# Patient Record
(demographics unavailable — no encounter records)

---

## 2024-11-27 NOTE — HISTORY OF PRESENT ILLNESS
[Condoms] : uses condoms [Y] : Patient is sexually active [Monogamous (Male Partner)] : is monogamous with a male partner [FreeTextEntry1] : Postcoital bleeding X 2  Last pap 6 months ago [LMPDate] : 11/6/24 [MensesFreq] : 29  [MensesLength] : 6 [PGHxTotal] : 0

## 2024-11-27 NOTE — PHYSICAL EXAM
[Chaperone Present] : A chaperone was present in the examining room during all aspects of the physical examination [Appropriately responsive] : appropriately responsive [Alert] : alert [No Acute Distress] : no acute distress [Soft] : soft [Non-tender] : non-tender [Non-distended] : non-distended [No HSM] : No HSM [No Lesions] : no lesions [No Mass] : no mass [Oriented x3] : oriented x3 [Labia Majora] : normal [Labia Minora] : normal [No Bleeding] : There was no active vaginal bleeding [Discharge] : discharge [Moderate] : moderate [White] : white [Thin] : thin [Normal] : normal [Normal Position] : in a normal position [Tenderness] : nontender [Enlarged ___ wks] : not enlarged [Mass ___ cm] : no uterine mass was palpated [Uterine Adnexae] : normal [FreeTextEntry5] : + bleeding with the pap

## 2024-12-17 NOTE — HISTORY OF PRESENT ILLNESS
[FreeTextEntry1] : 29 year old female presents to establish care for post coital bleeding. LMP 12/05/24 C/o several episodes of post coital bleeding after wiping. Did notice a small cut at introitus. Using condoms for contraception. Periods are every 29d and lasting 6d.   GYNHx: h/o Gardasil vaccine (2022) Allergies: Nitrofurantoin - nausea/vomiting  FamHx: HTN - father / HLD - father  [PapSmeardate] : 11/24

## 2024-12-17 NOTE — PLAN
[FreeTextEntry1] : 29 year old female presents for post coital bleeding  -Discussed lubrications -Recommended OTC hydrocortisone -Pt to monitor sxs

## 2024-12-17 NOTE — PHYSICAL EXAM
[Chaperone Present] : A chaperone was present in the examining room during all aspects of the physical examination [[id number]] : A chaperone was present during the pelvic exam. [Appropriately responsive] : appropriately responsive [Alert] : alert [No Acute Distress] : no acute distress [Soft] : soft [Non-tender] : non-tender [Non-distended] : non-distended [No HSM] : No HSM [No Lesions] : no lesions [No Mass] : no mass [Oriented x3] : oriented x3 [Labia Majora] : normal [Labia Minora] : normal [Normal] : normal [Uterine Adnexae] : normal [FreeTextEntry2] : Ana Laura Chin

## 2024-12-17 NOTE — END OF VISIT
[FreeTextEntry3] : I, Ana Laura Kris, acted as a scribe on behalf of Dr. Amanda Rico D.O. on 12/17/2024.  All medical entries made by the scribe were at my, Dr. Amanda Rcio D.O, direction and personally dictated by me on 12/17/2024. I have reviewed the chart and agree that the record accurately reflects my personal performance of the history, physical exam, assessment and plan. I have also personally directed, reviewed, and agreed with the chart.

## 2025-01-08 NOTE — DISCUSSION/SUMMARY
[FreeTextEntry1] : Postcoital-spotting- no evidence of labial/vaginal /or cx lesion.  Based upon pictures showing small abrasions.  Advised to increase lubrication/ change positions to increase opening at introitus. I spent the time noted on the day of this patient encounter preparing for, providing and documenting the above service. I have counseled and educated the patient on the differential, workup, disease course, and treatment/management plan. Education was provided to the patient during this encounter. All questions and concerns were answered and addressed in detail.

## 2025-01-08 NOTE — PHYSICAL EXAM
[Chaperone Present] : A chaperone was present in the examining room during all aspects of the physical examination [Labia Majora] : normal [Labia Minora] : normal [No Bleeding] : There was no active vaginal bleeding [Normal] : normal [Uterine Adnexae] : normal [Soft] : soft [Non-tender] : non-tender [Non-distended] : non-distended [Oriented x3] : oriented x3

## 2025-01-08 NOTE — HISTORY OF PRESENT ILLNESS
[FreeTextEntry1] : 28 yo presents for evaluation of few episodes of postcoital bleeding.  Patient was evaluated for same sxs showing negative cultures, pap and exam.  Patient's  showed 2 pics showing a small laceration/abrasion at introitus and right labia majora.  Patient denies any bleeding today.

## 2025-05-01 NOTE — REVIEW OF SYSTEMS
[Vertigo] : vertigo [Negative] : Eyes [Ear Pain] : no ear pain [Dizziness] : no dizziness [Recurrent Ear Infections] : no recurrent ear infections [Ear Drainage] : no ear drainage [Ear Noises] : no ear noises

## 2025-05-01 NOTE — REASON FOR VISIT
[Initial Evaluation] : an initial evaluation for [Hearing Loss] : hearing loss [Vertigo] : vertigo [FreeTextEntry2] : vertigo

## 2025-05-01 NOTE — HISTORY OF PRESENT ILLNESS
[de-identified] : Sierra Paul is a 31 yo female with hx BPPV who presents for evaluation of vertigo. She had an episode of vertigo about six days ago. She woke up in the morning and turned her head to the right, and experienced an episode of vertigo lasting for seconds. She had residual dysequilibrium for hte remainder of the day. She went to ED and she was given zofran and meclizine. She recovered from vertigo. She denies hearing loss, tinnitus, otalgia, otorrhea. She denies facial weakness or numbness. She denies weakness or numbness of extremities. She has influenza two weeks ago. She denies head trauma. She notes previous workup for vertigo wtih normal hearing at that time.

## 2025-05-01 NOTE — PHYSICAL EXAM
[de-identified] : Left cerumen impaction. Right EAC wnl. [Midline] : trachea located in midline position [Normal] : no rashes

## 2025-05-01 NOTE — ASSESSMENT
[FreeTextEntry1] : Sierra Paul presents for evaluation of vertigo. She has history of BPPV and notes previous normal workup with audiogram. ED notes from last week reviewed. History is consistent with benign paroxysmal positional vertigo. Left cerumen was removed. Will refer for [facility] vestibular rehab.  - f/u prn

## 2025-05-12 NOTE — PHYSICAL EXAM
[No Acute Distress] : no acute distress [Well-Appearing] : well-appearing [Normal Outer Ear/Nose] : the outer ears and nose were normal in appearance [Normal Oropharynx] : the oropharynx was normal [Normal TMs] : both tympanic membranes were normal [No Respiratory Distress] : no respiratory distress  [No Accessory Muscle Use] : no accessory muscle use [Clear to Auscultation] : lungs were clear to auscultation bilaterally [Normal Rate] : normal rate  [Regular Rhythm] : with a regular rhythm [Normal S1, S2] : normal S1 and S2 [No Murmur] : no murmur heard [Soft] : abdomen soft [Non Tender] : non-tender [Non-distended] : non-distended [No Focal Deficits] : no focal deficits [Normal Gait] : normal gait [Normal Affect] : the affect was normal [Alert and Oriented x3] : oriented to person, place, and time [de-identified] : L ear white cystic lesion on left side of external canal

## 2025-05-12 NOTE — ASSESSMENT
[FreeTextEntry1] : Case discussed with Dr. Reyes  RTC for CPE f/u with ENT for pearly lesion on L ear on canal

## 2025-05-12 NOTE — PHYSICAL EXAM
[No Acute Distress] : no acute distress [Well-Appearing] : well-appearing [Normal Outer Ear/Nose] : the outer ears and nose were normal in appearance [Normal Oropharynx] : the oropharynx was normal [Normal TMs] : both tympanic membranes were normal [No Respiratory Distress] : no respiratory distress  [No Accessory Muscle Use] : no accessory muscle use [Clear to Auscultation] : lungs were clear to auscultation bilaterally [Normal Rate] : normal rate  [Regular Rhythm] : with a regular rhythm [Normal S1, S2] : normal S1 and S2 [No Murmur] : no murmur heard [Soft] : abdomen soft [Non Tender] : non-tender [Non-distended] : non-distended [No Focal Deficits] : no focal deficits [Normal Gait] : normal gait [Normal Affect] : the affect was normal [Alert and Oriented x3] : oriented to person, place, and time [de-identified] : L ear white cystic lesion on left side of external canal

## 2025-05-12 NOTE — HISTORY OF PRESENT ILLNESS
[Post-hospitalization from ___ Hospital] : Post-hospitalization from [unfilled] Hospital [Discharge Summary] : discharge summary [Pertinent Labs] : pertinent labs [FreeTextEntry2] : POST ED VISIT: 31 y/o F with PMHx of vertigo presents with dizziness x 2 days. Admits dx with flu 2 weeks ago and has lingering dry cough and BL ear fullness. Denies weakness, cp, sob, difficulty breathing. Went to urgent care and prescribed meclizine but vomited nbnb x 4 since urgent care. Denies abd pain, cp, sob, difficulty breathing  Today, pt denies any vertigo. Saw ENT this week and had cerumen disimpaction of Left ear. She is worried with her glucose as her ED visit labs showed gluc 116. Denies fam hx of diabetes. Has not needed meclizine since ED visit (last week of April).

## 2025-05-12 NOTE — PHYSICAL EXAM
[No Acute Distress] : no acute distress [Well-Appearing] : well-appearing [Normal Outer Ear/Nose] : the outer ears and nose were normal in appearance [Normal Oropharynx] : the oropharynx was normal [Normal TMs] : both tympanic membranes were normal [No Respiratory Distress] : no respiratory distress  [No Accessory Muscle Use] : no accessory muscle use [Clear to Auscultation] : lungs were clear to auscultation bilaterally [Normal Rate] : normal rate  [Regular Rhythm] : with a regular rhythm [Normal S1, S2] : normal S1 and S2 [No Murmur] : no murmur heard [Soft] : abdomen soft [Non Tender] : non-tender [Non-distended] : non-distended [No Focal Deficits] : no focal deficits [Normal Gait] : normal gait [Normal Affect] : the affect was normal [Alert and Oriented x3] : oriented to person, place, and time [de-identified] : L ear white cystic lesion on left side of external canal

## 2025-05-12 NOTE — HISTORY OF PRESENT ILLNESS
[Post-hospitalization from ___ Hospital] : Post-hospitalization from [unfilled] Hospital [Discharge Summary] : discharge summary [Pertinent Labs] : pertinent labs [FreeTextEntry2] : POST ED VISIT: 29 y/o F with PMHx of vertigo presents with dizziness x 2 days. Admits dx with flu 2 weeks ago and has lingering dry cough and BL ear fullness. Denies weakness, cp, sob, difficulty breathing. Went to urgent care and prescribed meclizine but vomited nbnb x 4 since urgent care. Denies abd pain, cp, sob, difficulty breathing  Today, pt denies any vertigo. Saw ENT this week and had cerumen disimpaction of Left ear. She is worried with her glucose as her ED visit labs showed gluc 116. Denies fam hx of diabetes. Has not needed meclizine since ED visit (last week of April).

## 2025-05-12 NOTE — PHYSICAL EXAM
[No Acute Distress] : no acute distress [Well-Appearing] : well-appearing [Normal Outer Ear/Nose] : the outer ears and nose were normal in appearance [Normal Oropharynx] : the oropharynx was normal [Normal TMs] : both tympanic membranes were normal [No Respiratory Distress] : no respiratory distress  [No Accessory Muscle Use] : no accessory muscle use [Clear to Auscultation] : lungs were clear to auscultation bilaterally [Normal Rate] : normal rate  [Regular Rhythm] : with a regular rhythm [Normal S1, S2] : normal S1 and S2 [No Murmur] : no murmur heard [Soft] : abdomen soft [Non Tender] : non-tender [Non-distended] : non-distended [No Focal Deficits] : no focal deficits [Normal Gait] : normal gait [Normal Affect] : the affect was normal [Alert and Oriented x3] : oriented to person, place, and time [de-identified] : L ear white cystic lesion on left side of external canal

## 2025-05-15 NOTE — HISTORY OF PRESENT ILLNESS
[FreeTextEntry1] : 31 yo presents for evaluation of a tear at introitus usually after intercourse which is not presents now.  Pt c/o of white vaginal discharge.  She reports no vaginal spotting during deep penetration.  Prevous culture, pap.

## 2025-05-15 NOTE — PHYSICAL EXAM
[Appropriately responsive] : appropriately responsive [Alert] : alert [No Acute Distress] : no acute distress [No Lymphadenopathy] : no lymphadenopathy [Soft] : soft [Non-tender] : non-tender [Non-distended] : non-distended [No HSM] : No HSM [No Lesions] : no lesions [No Mass] : no mass [Oriented x3] : oriented x3 [Labia Majora] : normal [Labia Minora] : normal [Discharge] : a  ~M vaginal discharge was present [Moderate] : moderate [White] : white [No Bleeding] : There was no active vaginal bleeding [Normal] : normal [Uterine Adnexae] : normal [Vulvitis] : vulvitis [FreeTextEntry1] : mild redness w/o lesion

## 2025-05-15 NOTE — COUNSELING
[Nutrition/ Exercise/ Weight Management] : nutrition, exercise, weight management [Vitamins/Supplements] : vitamins/supplements [Bladder Hygiene] : bladder hygiene [Contraception/ Emergency Contraception/ Safe Sexual Practices] : contraception, emergency contraception, safe sexual practices

## 2025-05-15 NOTE — DISCUSSION/SUMMARY
[FreeTextEntry1] : Advised patient to increase vaginal moisturizers. , obtain vaginal dilators and use topical Estradiol 2-3 x per week in the vulva. I spent the time noted on the day of this patient encounter preparing for, providing and documenting the above service. I have counseled and educated the patient on the differential, workup, disease course, and treatment/management plan. Education was provided to the patient during this encounter. All questions and concerns were answered and addressed in detail.

## 2025-05-22 NOTE — PHYSICAL EXAM
[de-identified] : +cerumen in the outer canal of both ears, small round cystic-like lesion seen at around 7 o'clock was found to be rounded cerumen. - all removed. [Midline] : trachea located in midline position [Normal] : no rashes

## 2025-05-22 NOTE — PROCEDURE
[Risk and Benefits Discussed] : The purpose, risks, discomforts, benefits and alternatives of the procedure have been explained to the patient including no treatment. [Other ___] : [unfilled] [Same] : same as the Pre Op Dx. [] : Removal of Cerumen [FreeTextEntry6] : After informed verbal consent is obtained, cerumen is removed from both ear canals with suctioning. The patient tolerated the procedure well.  The small white cystic-like lesion was a ball of cerumen.  No bleeding, no erythema, no discharge, TM intact.

## 2025-05-22 NOTE — REASON FOR VISIT
[Initial Consultation] : an initial consultation for [FreeTextEntry2] : small cystic-like lesion in left ear canal

## 2025-07-01 NOTE — ASSESSMENT
[Vaccines Reviewed] : Immunizations reviewed today. Please see immunization details in the vaccine log within the immunization flowsheet.  [FreeTextEntry1] : completed physical exam, blood work ordered today, will follow up accordingly. HCM: up to date with pap screening vaccines: Tdap up to date, flu vaccine deferred COVID-19 vaccine up to date. Advised to get annual booster vaccine. vitals stable today no acute major health concerns today.

## 2025-07-01 NOTE — HEALTH RISK ASSESSMENT
[Very Good] : ~his/her~  mood as very good [Intercurrent ED visits] : went to ED [Yes] : Yes [Monthly or less (1 pt)] : Monthly or less (1 point) [1 or 2 (0 pts)] : 1 or 2 (0 points) [Never (0 pts)] : Never (0 points) [No] : In the past 12 months have you used drugs other than those required for medical reasons? No [No falls in past year] : Patient reported no falls in the past year [0] : 2) Feeling down, depressed, or hopeless: Not at all (0) [PHQ-2 Negative - No further assessment needed] : PHQ-2 Negative - No further assessment needed [Patient reported PAP Smear was normal] : Patient reported PAP Smear was normal [HIV test declined] : HIV test declined [Hepatitis C test declined] : Hepatitis C test declined [None] : None [With Family] : lives with family [Employed] : employed [Graduate School] : graduate school [] :  [Sexually Active] : sexually active [Feels Safe at Home] : Feels safe at home [Fully functional (bathing, dressing, toileting, transferring, walking, feeding)] : Fully functional (bathing, dressing, toileting, transferring, walking, feeding) [Fully functional (using the telephone, shopping, preparing meals, housekeeping, doing laundry, using] : Fully functional and needs no help or supervision to perform IADLs (using the telephone, shopping, preparing meals, housekeeping, doing laundry, using transportation, managing medications and managing finances) [Smoke Detector] : smoke detector [Carbon Monoxide Detector] : carbon monoxide detector [Safety elements used in home] : safety elements used in home [Seat Belt] :  uses seat belt [Sunscreen] : uses sunscreen [With Patient/Caregiver] : , with patient/caregiver [Reviewed no changes] : Reviewed, no changes [Name: ___] : Health Care Proxy's Name: [unfilled]  [Relationship: ___] : Relationship: [unfilled] [Aggressive treatment] : aggressive treatment [Never] : Never [NO] : No [[id number]] : 0 [Change in mental status noted] : No change in mental status noted [Language] : denies difficulty with language [Behavior] : denies difficulty with behavior [Learning/Retaining New Information] : denies difficulty learning/retaining new information [Handling Complex Tasks] : denies difficulty handling complex tasks [Reasoning] : denies difficulty with reasoning [Spatial Ability and Orientation] : denies difficulty with spatial ability and orientation [High Risk Behavior] : no high risk behavior [Reports changes in hearing] : Reports no changes in hearing [Reports changes in vision] : Reports no changes in vision [Reports changes in dental health] : Reports no changes in dental health [PapSmearDate] : 11/2024 [PapSmearComments] : f/u with gyn [de-identified] : dentist twice a year [AdvancecareDate] : 07/01/2025

## 2025-07-01 NOTE — HISTORY OF PRESENT ILLNESS
[FreeTextEntry1] : establish care, annual exam.  [de-identified] : Ms. Sierra Paul is s 29 yo female presents today for annual exam and labs. Pt does reports family hx of HLD, CVA, HLD and HTN. Pt was noted in the past hx of elevated blood glucose. Pt would complete labs done, also evaluate risks of diabetes. Pt also state hx of Hashimoto, however, pt denies any symptoms. Pt otherwise, denies any fever, chills, n/v/c/d.

## 2025-07-01 NOTE — PHYSICAL EXAM
[No Acute Distress] : no acute distress [Well Nourished] : well nourished [EOMI] : extraocular movements intact [Supple] : supple [Clear to Auscultation] : lungs were clear to auscultation bilaterally [Normal S1, S2] : normal S1 and S2 [No Edema] : there was no peripheral edema [Soft] : abdomen soft [Non Tender] : non-tender [No CVA Tenderness] : no CVA  tenderness [No Spinal Tenderness] : no spinal tenderness [No Rash] : no rash [Normal Gait] : normal gait [Normal Affect] : the affect was normal